# Patient Record
(demographics unavailable — no encounter records)

---

## 2025-03-31 NOTE — CONSULT LETTER
[Dear  ___] : Dear  [unfilled], [Courtesy Letter:] : I had the pleasure of seeing your patient, [unfilled], in my office today. [Consult Closing:] : Thank you very much for allowing me to participate in the care of this patient.  If you have any questions, please do not hesitate to contact me. [Donta Talbert MD] : Donta Talbert MD

## 2025-03-31 NOTE — PROCEDURE
[FreeTextEntry1] : rony done 1/17/24: no obstruction. no restriction. --------- Date of Exam: 03/03/2025 R. Phys. Name: Donta Talbert Scott R. PhysBerta Address: [address] [place] [address], [address] [place] [address], [facility] R. Phys. Phone: [phone] EXAM: CT-CHEST NON CONTRAST  HISTORY: Lung nodules.  R05.3 Chronic Cough R91.1 Pulmonary Nodule  COMPARISON: Noncontrast chest CT from 03/09/2024, 08/23/2023 and 05/15/2023.  TECHNIQUE: CT scan of the chest was performed with multislice axial images with reconstructions performed in axial, sagittal and coronal planes. This study was performed using automatic exposure control and an iterative reconstruction technique (radiation dose reduction software) to obtain a diagnostic image quality scan with patient dose as low as reasonably achievable. mA and kV were adjusted according to patient's size. The administered radiation dose was 3.248 mSv.  FINDINGS: THORACIC INLET: No suspicious mass.  LUNGS AND PLEURA: There is bronchial wall irregularity reflecting reactive airway changes. There are punctate nodularities reflecting bronchiolitis pattern. There are more accentuated subpleural reticular densities which may represent mild fibrotic changes.  There is stable subpleural nodule measuring 2-3 mm in anterior lateral right middle lobe on image 190 of series 13. New subpleural nodularities are seen along posterolateral right lower lobe measuring 2-3 mm on image 64 of series 2, and anterior left upper lobe on image 70 of series 2, probably transient inflammatory process.  The new punctate nodules noted previously are either stable or do not persist.  There is no suspicious new infiltrate or mass.  MEDIASTINUM, KATHARINE AND AXILLAE: There are stable subcentimeter nodes.  HEART AND VESSELS: Heart size is within normal limits. There is no aneurysm of thoracic aorta.  CHEST WALL/SOFT TISSUES: There are bilateral breast implants.  LIMITED UPPER ABDOMEN: No suspicious new finding.  BONES: No suspicious lesion.  CORONARY CALCIUM FINDINGS: No significant calcium noted within coronary arteries.  IMPRESSION:  Chronic bronchial inflammatory changes showing waxing and waning of small nodularities. Associated new nodularities as described. Continue surveillance with noncontrast CT in 12 months.  No new infiltrate or suspicious mass.   Electronically Signed By: Laura Gutierrez M.D., on 3/7/2025 9:08 PM   SIGNED BY: Laura Gutierrez M.D., Ext. [phone] 03/07/2025 09:08 PM  ------------- DATE OF EXAM: 03/09/2024 R. Phys. Name: Donta Talbert Scott RBerta PhysBerta Address: [address] [place] [address], [address] [place] [address], [facility] R. Phys. Phone: [phone] CT-CHEST NON CONTRAST  HISTORY: R05.3 Chronic Cough  TECHNIQUE: CT imaging of the chest was performed with multislice axial images acquired at 0.6 mm slice thickness with reconstructions performed in axial, sagittal and coronal planes. This study was performed using automatic exposure control and an iterative reconstruction technique (radiation dose reduction software) to obtain a diagnostic image quality scan with patient dose as low as reasonably achievable. The mA and kV were adjusted according to patient size. The administered radiation dose was 3.528 mSv. No IV contrast was administered.  COMPARISON: CT chest dated 8/23/2023  FINDINGS:  THYROID: Unremarkable.  BREAST: Peripherally calcified breast implants again noted.  LYMPH NODES: There is no lymphadenopathy.  MEDIASTINUM: Unremarkable.  HEART: Normal in size.  PERICARDIUM: There is no pericardial effusion.  THORACIC AORTA: Limited evaluation without IV contrast. Minimal atherosclerosis in the aortic arch.  PULMONARY ARTERIAL SYSTEM: Limited evaluation without IV contrast. No enlargement.  PLEURA: There is no pleural effusion.  LUNGS: Previously seen tiny lung nodules are unchanged. There has been interval development of a few other punctate lung nodules for instance a 1 mm nodule in the left lower lobe on series 15 image 135. There is no morphologically suspicious lung nodule.  AIRWAYS: The central airways are patent.  UPPER ABDOMEN: Limited imaging is unremarkable.  CHEST WALL: Unremarkable.  SUBCUTANEOUS TISSUES: Unremarkable.  BONES: Unremarkable.  CORONARY CALCIUM FINDINGS: No significant calcium noted within coronary arteries.  IMPRESSION:   Previously seen tiny lung nodules are unchanged. There has been interval development of a few other punctate 1 mm lung nodules which are likely benign. There is no morphologically suspicious lung nodule.  RECOMMENDATION  Pulmonary nodule: No follow-up needed if patient is low-risk (and has no known or suspected primary neoplasm). Non-contrast chest CT can be considered in 12 months if patient is high-risk (Per Fleischner Society guidelines).  Signed by: Clarence Casper MD Signed Date: 3/14/2024 4:24 PM EDT    SIGNED BY: Clarence Casper M.D., Ext. [phone] 03/14/2024 04:24 PM   -------- DATE OF EXAM: 08/23/2023 R. Phys. Name: Donta Talbert Scott R. Phys. Address: [address] [place] [address], [address] [place] [address], [facility] R. Phys. Phone: [phone] CT-CHEST NON CONTRAST  History: Follow-up groundglass opacities.     CT scan of the chest was performed with multislice axial images with reconstructions performed in axial, sagittal and coronal planes. This study was performed using automatic exposure control and an iterative reconstruction technique (radiation dose reduction software) to obtain a diagnostic image quality scan with patient dose as low as reasonably achievable. mA and kV were adjusted according to patient's size. The administered radiation dose was 3.26 mSv.  Comparison is made with noncontrast chest CT from 5/15/2023 and 12/3/2020.  THORACIC INLET: No suspicious mass.  LUNGS AND PLEURA: There is clearance in small groundglass nodular densities seen previously. Few new punctate groundglass nodularities have evolved, likely transient inflammatory process. For example left upper lobe on image 68 of series 10 and right upper lobe on image 84 series 10.  A tiny 2 to 3 mm subpleural nodule has evolved with minimal surrounding groundglass density on image 39 of series 10.  There is no other suspicious lung infiltrate or mass.  MEDIASTINUM, KATHARINE AND AXILLAE: There are stable small subcentimeter reactive nodes.  HEART AND VESSELS: Heart size is within normal limits. There is no aneurysm of thoracic aorta.  CHEST WALL/SOFT TISSUES: There are bilateral breast implants with diffuse wall calcifications.  LIMITED UPPER ABDOMEN: No suspicious new finding.  BONES: No suspicious finding.  IMPRESSION:  Clearance of small groundglass nodularities seen previously, meanwhile few new punctate groundglass nodularities have evolved, including small subpleural nodule in right lower lobe. Findings may reflect transient inflammatory process.  Suggest monitoring with follow-up noncontrast chest CT in 6 months to ensure stability/clearance.  Signed by: Laura Gutierrez MD Signed Date: 8/28/2023 5:07 PM EDT    SIGNED BY: Laura Gutierrez M.D., Ext. [phone] 08/28/2023 05:07 PM

## 2025-03-31 NOTE — HISTORY OF PRESENT ILLNESS
[Former] : former [FreeTextEntry1] : Hx lung nodules.  Had f/u CT chest done 3/2025 (reviewed below).   Reports she had URI X 3 (at least) this winter; abx, sometimes steroids but causes insomnia so she avoids them. At time of CT done 3/2025, was having URI. Occ chest congestion.   No sob, wheeze.   Not using albuterol. Causes anxiety. Does not help.    Issues with postnasal drip, sinus issues. Takes zyrtec prn.   Also with hx GERD. Acting up more lately.   Saw Cardio Dr Gonzalez in [place]. Echo stress done. Told all ok.    Hx of chronic sinusitis. Cough when she brushes her teeth and lies in bed. No change. Takes zyrtec which helps with sinus issues. Has had extensive oral surgery.  Has not been to ENT in a while.  Hx uterine CA. Sees Dr Varma.  Very light former smoker; never did regularly. Last cigarette likely more than 30 y/a but never did regularly.

## 2025-03-31 NOTE — PHYSICAL EXAM
[General Appearance - In No Acute Distress] : no acute distress [Normal Conjunctiva] : the conjunctiva exhibited no abnormalities [Neck Appearance] : the appearance of the neck was normal [] : the neck was supple [Heart Rate And Rhythm] : heart rate and rhythm were normal [Heart Sounds] : normal S1 and S2 [Bowel Sounds] : normal bowel sounds [Abdomen Soft] : soft [Abnormal Walk] : normal gait [Nail Clubbing] : no clubbing of the fingernails [Cyanosis, Localized] : no localized cyanosis [No Focal Deficits] : no focal deficits [Oriented To Time, Place, And Person] : oriented to person, place, and time [Impaired Insight] : insight and judgment were intact [Affect] : the affect was normal [Normal Rate] : the respiratory rate was normal [Rate ___] : at [unfilled] breaths per minute [Normal Rhythm/Effort] : normal respiratory rhythm and effort [Clear Bilaterally] : the lungs were clear to auscultation bilaterally [Normal Breath Sounds] : normal bilateral breath sounds

## 2025-03-31 NOTE — PLAN
[TextEntry] : Trial of ICS; flovent or whatever is covered. Aakash next visit. Repeat CT at 12 months ariel in March 2026.  For now, would use Zyrtec and flonase prn.  ENT f.u.  Trt for GERD. F/U with cardio. Annual flu vaccine.

## 2025-06-18 NOTE — HISTORY OF PRESENT ILLNESS
[Former] : former [FreeTextEntry1] : Hx lung nodules.  Had f/u CT chest done 3/2025 (reviewed below).   Reports she had URI X 3 (at least) this winter; abx, sometimes steroids but causes insomnia so she avoids them. At time of CT done 3/2025, was having URI. Occ chest congestion.   On last visit, gave arnuity. Not using it every day.   She reports that a couple of weeks ago, had a sinus infection. Got abx. Still with soar throat. Reports also lung feels irritated, just not right. No  sob, wheeze.  Using arnuity only prn. Not sure it helped. Not using albuterol. Causes anxiety. Does not help.   Aakash 6/18/25 is normal.   Issues with postnasal drip, sinus issues. Takes zyrtec prn.   Also with hx GERD. Acting up more lately.   Saw Cardio Dr Gonzalez in [place]. Echo stress done. Told all ok.    Hx of chronic sinusitis. Cough when she brushes her teeth and lies in bed. No change. Takes zyrtec which helps with sinus issues. Has had extensive oral surgery.  Still has not been to ENT in a while.  Hx uterine CA. Sees Dr Varma.  Very light former smoker; never did regularly. Last cigarette likely more than 30 y/a but never did regularly.

## 2025-06-18 NOTE — PLAN
[TextEntry] : Would take arnuity daily as maintenance. Rinse mouth after use. Albuterol prn.  Repeat CT at 12 months ariel in March 2026.  For now, would use Zyrtec and flonase prn.  ENT f.u. ASAP.  Trt for GERD. F/U with cardio. Annual flu vaccine.

## 2025-06-18 NOTE — PROCEDURE
[FreeTextEntry1] : rony done 6/18/25 is normal  rony done 1/17/24: no obstruction. no restriction. --------- Date of Exam: 03/03/2025 R. Phys. Name: Donta Talbert Scott R. Phys. Address: [address] [place] [address], [address] [place] [address], [facility] R. Phys. Phone: [phone] EXAM: CT-CHEST NON CONTRAST  HISTORY: Lung nodules.  R05.3 Chronic Cough R91.1 Pulmonary Nodule  COMPARISON: Noncontrast chest CT from 03/09/2024, 08/23/2023 and 05/15/2023.  TECHNIQUE: CT scan of the chest was performed with multislice axial images with reconstructions performed in axial, sagittal and coronal planes. This study was performed using automatic exposure control and an iterative reconstruction technique (radiation dose reduction software) to obtain a diagnostic image quality scan with patient dose as low as reasonably achievable. mA and kV were adjusted according to patient's size. The administered radiation dose was 3.248 mSv.  FINDINGS: THORACIC INLET: No suspicious mass.  LUNGS AND PLEURA: There is bronchial wall irregularity reflecting reactive airway changes. There are punctate nodularities reflecting bronchiolitis pattern. There are more accentuated subpleural reticular densities which may represent mild fibrotic changes.  There is stable subpleural nodule measuring 2-3 mm in anterior lateral right middle lobe on image 190 of series 13. New subpleural nodularities are seen along posterolateral right lower lobe measuring 2-3 mm on image 64 of series 2, and anterior left upper lobe on image 70 of series 2, probably transient inflammatory process.  The new punctate nodules noted previously are either stable or do not persist.  There is no suspicious new infiltrate or mass.  MEDIASTINUM, KATHARINE AND AXILLAE: There are stable subcentimeter nodes.  HEART AND VESSELS: Heart size is within normal limits. There is no aneurysm of thoracic aorta.  CHEST WALL/SOFT TISSUES: There are bilateral breast implants.  LIMITED UPPER ABDOMEN: No suspicious new finding.  BONES: No suspicious lesion.  CORONARY CALCIUM FINDINGS: No significant calcium noted within coronary arteries.  IMPRESSION:  Chronic bronchial inflammatory changes showing waxing and waning of small nodularities. Associated new nodularities as described. Continue surveillance with noncontrast CT in 12 months.  No new infiltrate or suspicious mass.   Electronically Signed By: Laura Gutierrez M.D., on 3/7/2025 9:08 PM   SIGNED BY: Laura Gutierrez M.D., Ext. [phone] 03/07/2025 09:08 PM  ------------- DATE OF EXAM: 03/09/2024 R. Phys. Name: Donta Talbert Scott RBerta PhysBerta Address: [address] [place] [address], [address], [facility] R Phys. Phone: [phone] CT-CHEST NON CONTRAST  HISTORY: R05.3 Chronic Cough  TECHNIQUE: CT imaging of the chest was performed with multislice axial images acquired at 0.6 mm slice thickness with reconstructions performed in axial, sagittal and coronal planes. This study was performed using automatic exposure control and an iterative reconstruction technique (radiation dose reduction software) to obtain a diagnostic image quality scan with patient dose as low as reasonably achievable. The mA and kV were adjusted according to patient size. The administered radiation dose was 3.528 mSv. No IV contrast was administered.  COMPARISON: CT chest dated 8/23/2023  FINDINGS:  THYROID: Unremarkable.  BREAST: Peripherally calcified breast implants again noted.  LYMPH NODES: There is no lymphadenopathy.  MEDIASTINUM: Unremarkable.  HEART: Normal in size.  PERICARDIUM: There is no pericardial effusion.  THORACIC AORTA: Limited evaluation without IV contrast. Minimal atherosclerosis in the aortic arch.  PULMONARY ARTERIAL SYSTEM: Limited evaluation without IV contrast. No enlargement.  PLEURA: There is no pleural effusion.  LUNGS: Previously seen tiny lung nodules are unchanged. There has been interval development of a few other punctate lung nodules for instance a 1 mm nodule in the left lower lobe on series 15 image 135. There is no morphologically suspicious lung nodule.  AIRWAYS: The central airways are patent.  UPPER ABDOMEN: Limited imaging is unremarkable.  CHEST WALL: Unremarkable.  SUBCUTANEOUS TISSUES: Unremarkable.  BONES: Unremarkable.  CORONARY CALCIUM FINDINGS: No significant calcium noted within coronary arteries.  IMPRESSION:   Previously seen tiny lung nodules are unchanged. There has been interval development of a few other punctate 1 mm lung nodules which are likely benign. There is no morphologically suspicious lung nodule.  RECOMMENDATION  Pulmonary nodule: No follow-up needed if patient is low-risk (and has no known or suspected primary neoplasm). Non-contrast chest CT can be considered in 12 months if patient is high-risk (Per Fleischner Society guidelines).  Signed by: Clarence Casper MD Signed Date: 3/14/2024 4:24 PM EDT    SIGNED BY: Clarence Casper M.D., Ext. [phone] 03/14/2024 04:24 PM   -------- DATE OF EXAM: 08/23/2023 R. Phys. Name: Donta Talbert Scott R. Phys. Address: [address] [place] [address], [address], [facility] R Phys. Phone: [phone] CT-CHEST NON CONTRAST  History: Follow-up groundglass opacities.     CT scan of the chest was performed with multislice axial images with reconstructions performed in axial, sagittal and coronal planes. This study was performed using automatic exposure control and an iterative reconstruction technique (radiation dose reduction software) to obtain a diagnostic image quality scan with patient dose as low as reasonably achievable. mA and kV were adjusted according to patient's size. The administered radiation dose was 3.26 mSv.  Comparison is made with noncontrast chest CT from 5/15/2023 and 12/3/2020.  THORACIC INLET: No suspicious mass.  LUNGS AND PLEURA: There is clearance in small groundglass nodular densities seen previously. Few new punctate groundglass nodularities have evolved, likely transient inflammatory process. For example left upper lobe on image 68 of series 10 and right upper lobe on image 84 series 10.  A tiny 2 to 3 mm subpleural nodule has evolved with minimal surrounding groundglass density on image 39 of series 10.  There is no other suspicious lung infiltrate or mass.  MEDIASTINUM, KATHARINE AND AXILLAE: There are stable small subcentimeter reactive nodes.  HEART AND VESSELS: Heart size is within normal limits. There is no aneurysm of thoracic aorta.  CHEST WALL/SOFT TISSUES: There are bilateral breast implants with diffuse wall calcifications.  LIMITED UPPER ABDOMEN: No suspicious new finding.  BONES: No suspicious finding.  IMPRESSION:  Clearance of small groundglass nodularities seen previously, meanwhile few new punctate groundglass nodularities have evolved, including small subpleural nodule in right lower lobe. Findings may reflect transient inflammatory process.  Suggest monitoring with follow-up noncontrast chest CT in 6 months to ensure stability/clearance.  Signed by: Laura Gutierrez MD Signed Date: 8/28/2023 5:07 PM EDT    SIGNED BY: Laura Gutierrez M.D., Ext. [phone] 08/28/2023 05:07 PM